# Patient Record
Sex: MALE | Race: WHITE | URBAN - METROPOLITAN AREA
[De-identification: names, ages, dates, MRNs, and addresses within clinical notes are randomized per-mention and may not be internally consistent; named-entity substitution may affect disease eponyms.]

---

## 2023-05-16 ENCOUNTER — LAB OUTSIDE AN ENCOUNTER (OUTPATIENT)
Dept: URBAN - METROPOLITAN AREA CLINIC 107 | Facility: CLINIC | Age: 43
End: 2023-05-16

## 2023-05-16 ENCOUNTER — OFFICE VISIT (OUTPATIENT)
Dept: URBAN - METROPOLITAN AREA CLINIC 107 | Facility: CLINIC | Age: 43
End: 2023-05-16
Payer: COMMERCIAL

## 2023-05-16 VITALS
SYSTOLIC BLOOD PRESSURE: 132 MMHG | WEIGHT: 300 LBS | HEIGHT: 74 IN | BODY MASS INDEX: 38.5 KG/M2 | HEART RATE: 101 BPM | RESPIRATION RATE: 20 BRPM | TEMPERATURE: 96.6 F | DIASTOLIC BLOOD PRESSURE: 76 MMHG

## 2023-05-16 DIAGNOSIS — R19.7 DIARRHEA, UNSPECIFIED TYPE: ICD-10-CM

## 2023-05-16 DIAGNOSIS — D50.0 IRON DEFICIENCY ANEMIA DUE TO CHRONIC BLOOD LOSS: ICD-10-CM

## 2023-05-16 PROBLEM — 724556004: Status: ACTIVE | Noted: 2023-05-16

## 2023-05-16 PROCEDURE — 99204 OFFICE O/P NEW MOD 45 MIN: CPT | Performed by: INTERNAL MEDICINE

## 2023-05-16 RX ORDER — CELECOXIB 200 MG/1
1 CAPSULE WITH FOOD CAPSULE ORAL ONCE A DAY
Status: ACTIVE | COMMUNITY

## 2023-05-16 RX ORDER — ATENOLOL 25 MG/1
1 TABLET TABLET ORAL ONCE A DAY
Status: ACTIVE | COMMUNITY

## 2023-05-16 RX ORDER — PHENTERMINE HYDROCHLORIDE 37.5 MG/1
1 TABLET BEFORE BREAKFAST TABLET ORAL ONCE A DAY
Status: ACTIVE | COMMUNITY

## 2023-05-16 RX ORDER — METHYLPHENIDATE HYDROCHLORIDE 36 MG/1
1 TABLET IN THE MORNING TABLET, EXTENDED RELEASE ORAL ONCE A DAY
Status: ACTIVE | COMMUNITY

## 2023-05-16 RX ORDER — LISINOPRIL 20 MG/1
1 TABLET TABLET ORAL ONCE A DAY
Status: ACTIVE | COMMUNITY

## 2023-05-16 NOTE — HPI-TODAY'S VISIT:
Abdias Gracia is a morbidly obese 42-year-old male who presents with new onset iron deficiency anemia.  The patient's baseline hemoglobin previously was 16.3.  He recently had labs done showing a hemoglobin of 9.4 with an MCV of 67.  He has a longstanding history of diarrhea predominant irritable bowel syndrome, dating back 12 years.  He averages 3-5 loose stools per day.  He normally has bright red rectal bleeding with defecation, particularly over the last 2 years.  Over the last 6 months, he has noted progressive weakness with minimal exertion, and states that he is "weak all the time."  He is lightheaded every day.  He becomes winded with minimal exertion.  He has had no weight loss, denies any fever, chills, or sweats, and there is no family history of Crohn's disease, ulcerative colitis, colon cancer, etc.  He has never had a colonoscopy in the past.

## 2023-05-19 ENCOUNTER — OFFICE VISIT (OUTPATIENT)
Dept: URBAN - METROPOLITAN AREA MEDICAL CENTER 19 | Facility: MEDICAL CENTER | Age: 43
End: 2023-05-19

## 2023-05-19 ENCOUNTER — CLAIMS CREATED FROM THE CLAIM WINDOW (OUTPATIENT)
Dept: URBAN - METROPOLITAN AREA MEDICAL CENTER 19 | Facility: MEDICAL CENTER | Age: 43
End: 2023-05-19
Payer: COMMERCIAL

## 2023-05-19 DIAGNOSIS — R19.7 DIARRHEA, UNSPECIFIED TYPE: ICD-10-CM

## 2023-05-19 DIAGNOSIS — D50.9 IRON DEFICIENCY ANEMIA, UNSPECIFIED IRON DEFICIENCY ANEMIA TYPE: ICD-10-CM

## 2023-05-19 DIAGNOSIS — K57.30 ACQUIRED DIVERTICULOSIS OF COLON: ICD-10-CM

## 2023-05-19 DIAGNOSIS — K92.1 ACUTE MELENA: ICD-10-CM

## 2023-05-19 DIAGNOSIS — K64.0 BLEEDING GRADE I HEMORRHOIDS: ICD-10-CM

## 2023-05-19 PROCEDURE — 45380 COLONOSCOPY AND BIOPSY: CPT | Performed by: INTERNAL MEDICINE

## 2023-05-26 ENCOUNTER — TELEPHONE ENCOUNTER (OUTPATIENT)
Dept: URBAN - METROPOLITAN AREA CLINIC 107 | Facility: CLINIC | Age: 43
End: 2023-05-26

## 2023-05-30 ENCOUNTER — WEB ENCOUNTER (OUTPATIENT)
Dept: URBAN - METROPOLITAN AREA CLINIC 107 | Facility: CLINIC | Age: 43
End: 2023-05-30

## 2023-05-30 ENCOUNTER — LAB OUTSIDE AN ENCOUNTER (OUTPATIENT)
Dept: URBAN - METROPOLITAN AREA CLINIC 107 | Facility: CLINIC | Age: 43
End: 2023-05-30

## 2023-05-30 ENCOUNTER — OFFICE VISIT (OUTPATIENT)
Dept: URBAN - METROPOLITAN AREA CLINIC 107 | Facility: CLINIC | Age: 43
End: 2023-05-30
Payer: COMMERCIAL

## 2023-05-30 VITALS
BODY MASS INDEX: 37.5 KG/M2 | SYSTOLIC BLOOD PRESSURE: 124 MMHG | DIASTOLIC BLOOD PRESSURE: 65 MMHG | HEART RATE: 94 BPM | WEIGHT: 292.2 LBS | TEMPERATURE: 97.6 F | RESPIRATION RATE: 18 BRPM | HEIGHT: 74 IN

## 2023-05-30 DIAGNOSIS — K58.0 IRRITABLE BOWEL SYNDROME WITH DIARRHEA: ICD-10-CM

## 2023-05-30 DIAGNOSIS — K64.8 INTERNAL HEMORRHOIDS: ICD-10-CM

## 2023-05-30 DIAGNOSIS — K62.5 RECTAL BLEEDING: ICD-10-CM

## 2023-05-30 DIAGNOSIS — D50.0 IRON DEFICIENCY ANEMIA DUE TO CHRONIC BLOOD LOSS: ICD-10-CM

## 2023-05-30 PROBLEM — 12063002: Status: ACTIVE | Noted: 2023-05-30

## 2023-05-30 PROBLEM — 197125005: Status: ACTIVE | Noted: 2023-05-30

## 2023-05-30 PROBLEM — 62315008: Status: ACTIVE | Noted: 2023-05-30

## 2023-05-30 PROBLEM — 90458007: Status: ACTIVE | Noted: 2023-05-30

## 2023-05-30 PROCEDURE — 99214 OFFICE O/P EST MOD 30 MIN: CPT | Performed by: INTERNAL MEDICINE

## 2023-05-30 RX ORDER — PHENTERMINE HYDROCHLORIDE 37.5 MG/1
1 TABLET BEFORE BREAKFAST TABLET ORAL ONCE A DAY
Status: ACTIVE | COMMUNITY

## 2023-05-30 RX ORDER — DICYCLOMINE HYDROCHLORIDE 10 MG/1
1 TABLET CAPSULE ORAL
Status: ACTIVE | COMMUNITY

## 2023-05-30 RX ORDER — PANTOPRAZOLE SODIUM 40 MG/1
1 TABLET TABLET, DELAYED RELEASE ORAL ONCE A DAY
Qty: 30 | Refills: 5 | OUTPATIENT
Start: 2023-05-30

## 2023-05-30 RX ORDER — HYDROCORTISONE ACETATE 25 MG/1
1 SUPPOSITORY SUPPOSITORY RECTAL AT BEDTIME
Qty: 7 SUPPOSITORIES | Refills: 1 | OUTPATIENT
Start: 2023-05-30 | End: 2023-06-13

## 2023-05-30 RX ORDER — METHYLPHENIDATE HYDROCHLORIDE 36 MG/1
1 TABLET IN THE MORNING TABLET, EXTENDED RELEASE ORAL ONCE A DAY
Status: ACTIVE | COMMUNITY

## 2023-05-30 RX ORDER — ATENOLOL 25 MG/1
1 TABLET TABLET ORAL ONCE A DAY
Status: ACTIVE | COMMUNITY

## 2023-05-30 RX ORDER — LISINOPRIL 20 MG/1
1 TABLET TABLET ORAL ONCE A DAY
Status: ACTIVE | COMMUNITY

## 2023-05-30 RX ORDER — POTASSIUM CHLORIDE 1500 MG/1
1 TABLET WITH FOOD TABLET, EXTENDED RELEASE ORAL ONCE A DAY
Status: ACTIVE | COMMUNITY

## 2023-05-30 RX ORDER — DICYCLOMINE HYDROCHLORIDE 10 MG/1
1 TABLET CAPSULE ORAL
Qty: 90 | Refills: 3 | OUTPATIENT
Start: 2023-05-30 | End: 2023-09-27

## 2023-05-30 RX ORDER — CELECOXIB 200 MG/1
1 CAPSULE WITH FOOD CAPSULE ORAL ONCE A DAY
Status: ACTIVE | COMMUNITY

## 2023-05-30 NOTE — HPI-TODAY'S VISIT:
This is a 42-year-old male with  a history of ADD, hypertension, anxiety/depression, GERD, IBS, iron deficiency anemia, and diarrhea presenting for urgent follow-up after an ED visit for diarrhea and abdominal pain. He was initially seen 5/16/2023 for evaluation of iron deficiency anemia and diarrhea.  He was scheduled for a colonoscopy.   At baseline, he has diarrhea predominant irritable bowel syndrome.  He typically has 2-4 loose bowel movements per day.  For the last year, he reports there is "always" been some blood with every bowel movement described as small volume on the tissue in the toilet water.  He is also noticed occasional clots. On 5/23/2023, he developed significant diarrhea reporting a bowel movement every hour.  This persisted for over 24 hours.  Stool frequency improved.  He ate chicken noodle soup and bowel movements every hour resumed.  This persisted.  He went to the hospital where he was observed for 24 hours.  He did not have bowel movements while he was at the hospital.  He produced a small volume stool there was an adequate enough to perform stool studies.  On 5/28 and 5/29, he had loose bowel movements occurring 2-4 times per day.  He did not notice blood with any of his bowel movements during this episode.  Today, he had a bowel movement that contained a small volume of stool and nothing bright red blood.  Another stool contained nothing but red blood.  He denies proctalgia. For IBS, he takes dicyclomine twice a day.  He reports this "settles" his stomach.  He has been on Celebrex 200 mg twice a day for a year.  Prior to that, he was taking Motrin.  He denies heartburn, regurgitation, dysphagia, or nausea.  He denies any other abdominal symptoms.  He denies fever. He reports feeling somewhat lightheaded when he stands.  This lasts for a few minutes and resolves.  He reports fatigue associated with walking.  He denies dyspnea or chest pain. He is not on a fiber supplement.  He takes probiotics daily.  He started taking oral iron yesterday.  He was prescribed potassium supplements for 3 days following hospital discharge.

## 2023-05-30 NOTE — HPI-OTHER HISTORIES
Colonoscopy 5/19/2023: Mild sigmoid diverticulosis, normal terminal ileum, nonbleeding internal hemorrhoids, the entire colon was normal status post biopsy.  Pathology: Random biopsies demonstrated fragments of benign colonic mucosa with no significant diagnostic abnormality.  Negative features suggestive of microscopic colitis. Labs  5/27/2023:CBC: WBC 6.3, hemoglobin 6.4, MCV 67.7, platelet 254.  BMP normal with exception of glucose 109, potassium 2.8, calcium 7.0.  Cellavision smear review: Moderate anisocytes, microcytes, ovalocytes.  5/26/2023: CBC: WBC 7.2, hemoglobin 7.7, MCV 68.3, platelet 304.  BMP normal with exception of glucose 111, potassium 3, BUN 8, calcium 7.4.  LFTs: TB 0.5, ALP 44, ALT 53, AST 60.  Urinalysis notable for 2+ protein, 13 WBC, moderate bacteria, many mucus, 32 hyaline casts.  HIV nonreactive.  Ferritin 9, iron 16.88.  Magnesium 1.4.  Hepatitis C antibody nonreactive.  Folate greater than 20. Chest x-ray 5/26/2023:No acute chest finding.

## 2023-05-31 ENCOUNTER — LAB OUTSIDE AN ENCOUNTER (OUTPATIENT)
Dept: URBAN - METROPOLITAN AREA CLINIC 113 | Facility: CLINIC | Age: 43
End: 2023-05-31

## 2023-05-31 ENCOUNTER — TELEPHONE ENCOUNTER (OUTPATIENT)
Dept: URBAN - METROPOLITAN AREA CLINIC 113 | Facility: CLINIC | Age: 43
End: 2023-05-31

## 2023-05-31 PROBLEM — 87522002: Status: ACTIVE | Noted: 2023-05-31

## 2023-06-05 ENCOUNTER — TELEPHONE ENCOUNTER (OUTPATIENT)
Dept: URBAN - METROPOLITAN AREA CLINIC 113 | Facility: CLINIC | Age: 43
End: 2023-06-05

## 2023-06-07 ENCOUNTER — TELEPHONE ENCOUNTER (OUTPATIENT)
Dept: URBAN - METROPOLITAN AREA CLINIC 113 | Facility: CLINIC | Age: 43
End: 2023-06-07

## 2023-06-07 LAB
ABSOLUTE BASOPHILS: 67
ABSOLUTE EOSINOPHILS: 79
ABSOLUTE LYMPHOCYTES: 1208
ABSOLUTE MONOCYTES: 403
ABSOLUTE NEUTROPHILS: 4343
BASOPHILS: 1.1
CBC MORPHOLOGY: (no result)
EOSINOPHILS: 1.3
HEMATOCRIT: 30.8
HEMOGLOBIN: 8.5
LYMPHOCYTES: 19.8
MCH: 19.5
MCHC: 27.6
MCV: 70.8
MONOCYTES: 6.6
MPV: 10.1
NEUTROPHILS: 71.2
PLATELET COUNT: 296
RDW: 23.7
RED BLOOD CELL COUNT: 4.35
WHITE BLOOD CELL COUNT: 6.1

## 2023-06-14 ENCOUNTER — CLAIMS CREATED FROM THE CLAIM WINDOW (OUTPATIENT)
Dept: URBAN - METROPOLITAN AREA SURGERY CENTER 25 | Facility: SURGERY CENTER | Age: 43
End: 2023-06-14
Payer: COMMERCIAL

## 2023-06-14 ENCOUNTER — CLAIMS CREATED FROM THE CLAIM WINDOW (OUTPATIENT)
Dept: URBAN - METROPOLITAN AREA SURGERY CENTER 25 | Facility: SURGERY CENTER | Age: 43
End: 2023-06-14

## 2023-06-14 ENCOUNTER — CLAIMS CREATED FROM THE CLAIM WINDOW (OUTPATIENT)
Dept: URBAN - METROPOLITAN AREA CLINIC 4 | Facility: CLINIC | Age: 43
End: 2023-06-14
Payer: COMMERCIAL

## 2023-06-14 DIAGNOSIS — K31.89 OTHER DISEASES OF STOMACH AND DUODENUM: ICD-10-CM

## 2023-06-14 DIAGNOSIS — D50.8 OTHER IRON DEFICIENCY ANEMIA: ICD-10-CM

## 2023-06-14 DIAGNOSIS — D50.9 ANEMIA, IRON DEFICIENCY: ICD-10-CM

## 2023-06-14 DIAGNOSIS — K44.9 DIAPHRAGMATIC HERNIA: ICD-10-CM

## 2023-06-14 DIAGNOSIS — K29.50 CHRONIC GASTRITIS: ICD-10-CM

## 2023-06-14 DIAGNOSIS — K29.60 OTHER GASTRITIS WITHOUT BLEEDING: ICD-10-CM

## 2023-06-14 DIAGNOSIS — K29.70 GASTRITIS, UNSPECIFIED, WITHOUT BLEEDING: ICD-10-CM

## 2023-06-14 PROCEDURE — 88305 TISSUE EXAM BY PATHOLOGIST: CPT | Performed by: PATHOLOGY

## 2023-06-14 PROCEDURE — 00731 ANES UPR GI NDSC PX NOS: CPT | Performed by: ANESTHESIOLOGY

## 2023-06-14 PROCEDURE — 88313 SPECIAL STAINS GROUP 2: CPT | Performed by: PATHOLOGY

## 2023-06-14 PROCEDURE — G8907 PT DOC NO EVENTS ON DISCHARG: HCPCS | Performed by: INTERNAL MEDICINE

## 2023-06-14 PROCEDURE — 43239 EGD BIOPSY SINGLE/MULTIPLE: CPT | Performed by: INTERNAL MEDICINE

## 2023-06-14 PROCEDURE — 00731 ANES UPR GI NDSC PX NOS: CPT | Performed by: ANESTHESIOLOGIST ASSISTANT

## 2023-06-14 PROCEDURE — 88312 SPECIAL STAINS GROUP 1: CPT | Performed by: PATHOLOGY

## 2023-06-14 RX ORDER — METHYLPHENIDATE HYDROCHLORIDE 36 MG/1
1 TABLET IN THE MORNING TABLET, EXTENDED RELEASE ORAL ONCE A DAY
Status: ACTIVE | COMMUNITY

## 2023-06-14 RX ORDER — POTASSIUM CHLORIDE 1500 MG/1
1 TABLET WITH FOOD TABLET, EXTENDED RELEASE ORAL ONCE A DAY
Status: ACTIVE | COMMUNITY

## 2023-06-14 RX ORDER — PHENTERMINE HYDROCHLORIDE 37.5 MG/1
1 TABLET BEFORE BREAKFAST TABLET ORAL ONCE A DAY
Status: ACTIVE | COMMUNITY

## 2023-06-14 RX ORDER — DICYCLOMINE HYDROCHLORIDE 10 MG/1
1 TABLET CAPSULE ORAL
Qty: 90 | Refills: 3 | Status: ACTIVE | COMMUNITY
Start: 2023-05-30 | End: 2023-09-27

## 2023-06-14 RX ORDER — CELECOXIB 200 MG/1
1 CAPSULE WITH FOOD CAPSULE ORAL ONCE A DAY
Status: ACTIVE | COMMUNITY

## 2023-06-14 RX ORDER — PANTOPRAZOLE SODIUM 40 MG/1
1 TABLET TABLET, DELAYED RELEASE ORAL ONCE A DAY
Qty: 30 | Refills: 5 | Status: ACTIVE | COMMUNITY
Start: 2023-05-30

## 2023-06-14 RX ORDER — DICYCLOMINE HYDROCHLORIDE 10 MG/1
1 TABLET CAPSULE ORAL
Status: ACTIVE | COMMUNITY

## 2023-06-14 RX ORDER — LISINOPRIL 20 MG/1
1 TABLET TABLET ORAL ONCE A DAY
Status: ACTIVE | COMMUNITY

## 2023-06-14 RX ORDER — ATENOLOL 25 MG/1
1 TABLET TABLET ORAL ONCE A DAY
Status: ACTIVE | COMMUNITY

## 2023-06-15 LAB
HEMATOCRIT: 38.8
HEMOGLOBIN: 11
MCH: 21.9
MCHC: 28.4
MCV: 77.3
MPV: 9.7
PLATELET COUNT: 273
RDW: 27.5
RED BLOOD CELL COUNT: 5.02
WHITE BLOOD CELL COUNT: 3.7

## 2023-08-15 ENCOUNTER — OFFICE VISIT (OUTPATIENT)
Dept: URBAN - METROPOLITAN AREA CLINIC 107 | Facility: CLINIC | Age: 43
End: 2023-08-15
Payer: COMMERCIAL

## 2023-08-15 ENCOUNTER — LAB OUTSIDE AN ENCOUNTER (OUTPATIENT)
Dept: URBAN - METROPOLITAN AREA CLINIC 107 | Facility: CLINIC | Age: 43
End: 2023-08-15

## 2023-08-15 VITALS
TEMPERATURE: 97.1 F | SYSTOLIC BLOOD PRESSURE: 133 MMHG | DIASTOLIC BLOOD PRESSURE: 79 MMHG | WEIGHT: 291 LBS | HEART RATE: 95 BPM | RESPIRATION RATE: 18 BRPM | HEIGHT: 74 IN | BODY MASS INDEX: 37.35 KG/M2

## 2023-08-15 DIAGNOSIS — K64.8 INTERNAL HEMORRHOIDS: ICD-10-CM

## 2023-08-15 DIAGNOSIS — D50.0 IRON DEFICIENCY ANEMIA DUE TO CHRONIC BLOOD LOSS: ICD-10-CM

## 2023-08-15 DIAGNOSIS — K62.5 RECTAL BLEEDING: ICD-10-CM

## 2023-08-15 DIAGNOSIS — K58.0 IRRITABLE BOWEL SYNDROME WITH DIARRHEA: ICD-10-CM

## 2023-08-15 PROCEDURE — 99214 OFFICE O/P EST MOD 30 MIN: CPT | Performed by: INTERNAL MEDICINE

## 2023-08-15 RX ORDER — COLESEVELAM HYDROCHLORIDE 625 MG/1
1 TABLET TABLET, COATED ORAL TWICE A DAY
Qty: 60 TABLET | Refills: 5 | OUTPATIENT
Start: 2023-08-15

## 2023-08-15 RX ORDER — METHYLPHENIDATE HYDROCHLORIDE 36 MG/1
1 TABLET IN THE MORNING TABLET, EXTENDED RELEASE ORAL ONCE A DAY
Status: ACTIVE | COMMUNITY

## 2023-08-15 RX ORDER — POTASSIUM CHLORIDE 1500 MG/1
1 TABLET WITH FOOD TABLET, EXTENDED RELEASE ORAL ONCE A DAY
Status: ACTIVE | COMMUNITY

## 2023-08-15 RX ORDER — DICYCLOMINE HYDROCHLORIDE 10 MG/1
1 TABLET CAPSULE ORAL
Qty: 90 | Refills: 3 | Status: ACTIVE | COMMUNITY
Start: 2023-05-30 | End: 2023-09-27

## 2023-08-15 RX ORDER — LISINOPRIL 20 MG/1
1 TABLET TABLET ORAL ONCE A DAY
Status: ACTIVE | COMMUNITY

## 2023-08-15 RX ORDER — CELECOXIB 200 MG/1
1 CAPSULE WITH FOOD CAPSULE ORAL ONCE A DAY
Status: ACTIVE | COMMUNITY

## 2023-08-15 RX ORDER — PANTOPRAZOLE SODIUM 40 MG/1
1 TABLET TABLET, DELAYED RELEASE ORAL ONCE A DAY
Qty: 30 | Refills: 5 | Status: ACTIVE | COMMUNITY
Start: 2023-05-30

## 2023-08-15 RX ORDER — ATENOLOL 25 MG/1
1 TABLET TABLET ORAL ONCE A DAY
Status: ACTIVE | COMMUNITY

## 2023-08-15 RX ORDER — PHENTERMINE HYDROCHLORIDE 37.5 MG/1
1 TABLET BEFORE BREAKFAST TABLET ORAL ONCE A DAY
Status: ACTIVE | COMMUNITY

## 2023-08-15 NOTE — HPI-TODAY'S VISIT:
This is a 42-year-old male with a history of ADD, hypertension, anxiety/depression, GERD, IBS, iron deficiency anemia, and diarrhea presenting for follow-up. He was last seen 5/30/2023 for iron deficiency anemia associated with chronic GI blood loss.  He reported chronic small-volume red blood per rectum that had worsened the day of his visit.  He had worsening diarrhea he was found to have significant anemia.  He declined a blood transfusion.  Requiring an emergency department visit on 5/26.  A stat CBC was recommended.  It was suspected that bleeding may be hemorrhoidal in origin.  Transfusion was a consideration if his hemoglobin had decreased.  In the interim, he was to begin daily fiber and use Anusol suppositories.  He was to stop Celebrex.  He was to start pantoprazole 40 mg daily to cover upper GI pathology.  EGD was recommended to assess for gastroduodenal pathology.  Due to recent electrolyte imbalance associated with diarrhea, electrolytes were also rechecked. Hemoglobin was 6.7.  2 units of packed red blood cells were ordered.  He was to increase ferrous sulfate to twice a day and repeat labs the following week.  Referral for definitive treatment of hemorrhoids was a consideration. He called our office 6/7/2023 complaining of persistent bleeding.  A repeat CBC was ordered.  He was taking Benefiber 2 tablespoons daily for 1 week. EGD 6/14/2023:Irregular Z-line at the GE junction status post biopsy, small hiatal hernia, small amount of food residue in stomach, no gross lesions in the duodenum status post biopsy, exam otherwise normal.  Pathology: Duodenal biopsy demonstrated no significant abnormality.  GE junction biopsy demonstrated chronic gastritis, nonspecific without evidence of H. pylori or intestinal metaplasia. CBC 6/14/2023:WBC 3.7, hemoglobin 11, MCV 77.3, platelet 273. He reports bleeding subsided for a while.  It has recurred for the last 2 weeks with every bowel movement.  Blood is red and mixed in the stool or in the toilet water.  It is of a variable volume.  He was having 5 or 6 loose or watery bowel movements per day. He denies nocturnal stools.  Occasionally, he has urgency to defecate, has a bowel movement, and afterward, he still feels as though "my body is trying to push it out."  This will result in feeling hot and sweaty.  He is not having abdominal pain or cramps.  He is taking dicyclomine twice a day.  He denies any other abdominal symptoms.  He is no longer feeling weak as he was prior to his transfusion.

## 2023-08-15 NOTE — HPI-OTHER HISTORIES
EGD 6/14/2023:Irregular Z-line at the GE junction status post biopsy, small hiatal hernia, small amount of food residue in stomach, no gross lesions in the duodenum status post biopsy, exam otherwise normal.  Pathology: Duodenal biopsy demonstrated no significant abnormality.  GE junction biopsy demonstrated chronic gastritis, nonspecific without evidence of H. pylori or intestinal metaplasia. CBC 6/14/2023:WBC 3.7, hemoglobin 11, MCV 77.3, platelet 273. Colonoscopy 5/19/2023: Mild sigmoid diverticulosis, normal terminal ileum, nonbleeding internal hemorrhoids, the entire colon was normal status post biopsy.  Pathology: Random biopsies demonstrated fragments of benign colonic mucosa with no significant diagnostic abnormality.  Negative features suggestive of microscopic colitis. Labs  5/27/2023:CBC: WBC 6.3, hemoglobin 6.4, MCV 67.7, platelet 254.  BMP normal with exception of glucose 109, potassium 2.8, calcium 7.0.  Cellavision smear review: Moderate anisocytes, microcytes, ovalocytes.  5/26/2023: CBC: WBC 7.2, hemoglobin 7.7, MCV 68.3, platelet 304.  BMP normal with exception of glucose 111, potassium 3, BUN 8, calcium 7.4.  LFTs: TB 0.5, ALP 44, ALT 53, AST 60.  Urinalysis notable for 2+ protein, 13 WBC, moderate bacteria, many mucus, 32 hyaline casts.  HIV nonreactive.  Ferritin 9, iron 16.88.  Magnesium 1.4.  Hepatitis C antibody nonreactive.  Folate greater than 20. Chest x-ray 5/26/2023:No acute chest finding.

## 2023-09-14 ENCOUNTER — OFFICE VISIT (OUTPATIENT)
Dept: URBAN - METROPOLITAN AREA CLINIC 112 | Facility: CLINIC | Age: 43
End: 2023-09-14
Payer: COMMERCIAL

## 2023-09-14 ENCOUNTER — OFFICE VISIT (OUTPATIENT)
Dept: URBAN - METROPOLITAN AREA CLINIC 112 | Facility: CLINIC | Age: 43
End: 2023-09-14

## 2023-09-14 VITALS — BODY MASS INDEX: 36.83 KG/M2 | HEIGHT: 74 IN | WEIGHT: 287 LBS

## 2023-09-14 DIAGNOSIS — D50.0 IRON DEFICIENCY ANEMIA DUE TO CHRONIC BLOOD LOSS: ICD-10-CM

## 2023-09-14 PROCEDURE — 91110 GI TRC IMG INTRAL ESOPH-ILE: CPT | Performed by: INTERNAL MEDICINE

## 2023-09-14 RX ORDER — PHENTERMINE HYDROCHLORIDE 37.5 MG/1
1 TABLET BEFORE BREAKFAST TABLET ORAL ONCE A DAY
Status: ACTIVE | COMMUNITY

## 2023-09-14 RX ORDER — CELECOXIB 200 MG/1
1 CAPSULE WITH FOOD CAPSULE ORAL ONCE A DAY
Status: ACTIVE | COMMUNITY

## 2023-09-14 RX ORDER — ATENOLOL 25 MG/1
1 TABLET TABLET ORAL ONCE A DAY
Status: ACTIVE | COMMUNITY

## 2023-09-14 RX ORDER — LISINOPRIL 20 MG/1
1 TABLET TABLET ORAL ONCE A DAY
Status: ACTIVE | COMMUNITY

## 2023-09-14 RX ORDER — DICYCLOMINE HYDROCHLORIDE 10 MG/1
1 TABLET CAPSULE ORAL
Qty: 90 | Refills: 3 | Status: ACTIVE | COMMUNITY
Start: 2023-05-30 | End: 2023-09-27

## 2023-09-14 RX ORDER — POTASSIUM CHLORIDE 1500 MG/1
1 TABLET WITH FOOD TABLET, EXTENDED RELEASE ORAL ONCE A DAY
Status: ACTIVE | COMMUNITY

## 2023-09-14 RX ORDER — COLESEVELAM HYDROCHLORIDE 625 MG/1
1 TABLET TABLET, COATED ORAL TWICE A DAY
Qty: 60 TABLET | Refills: 5 | Status: ACTIVE | COMMUNITY
Start: 2023-08-15

## 2023-09-14 RX ORDER — METHYLPHENIDATE HYDROCHLORIDE 36 MG/1
1 TABLET IN THE MORNING TABLET, EXTENDED RELEASE ORAL ONCE A DAY
Status: ACTIVE | COMMUNITY

## 2023-09-14 RX ORDER — PANTOPRAZOLE SODIUM 40 MG/1
1 TABLET TABLET, DELAYED RELEASE ORAL ONCE A DAY
Qty: 30 | Refills: 5 | Status: ACTIVE | COMMUNITY
Start: 2023-05-30

## 2023-09-15 ENCOUNTER — TELEPHONE ENCOUNTER (OUTPATIENT)
Dept: URBAN - METROPOLITAN AREA CLINIC 113 | Facility: CLINIC | Age: 43
End: 2023-09-15

## 2023-11-12 ENCOUNTER — ERX REFILL RESPONSE (OUTPATIENT)
Dept: URBAN - METROPOLITAN AREA CLINIC 72 | Facility: CLINIC | Age: 43
End: 2023-11-12

## 2023-11-12 RX ORDER — PANTOPRAZOLE SODIUM 40 MG/1
1 TABLET TABLET, DELAYED RELEASE ORAL ONCE A DAY
Qty: 30 | Refills: 5 | OUTPATIENT

## 2023-11-12 RX ORDER — PANTOPRAZOLE 40 MG/1
TAKE 1 TABLET BY MOUTH ONCE DAILY TABLET, DELAYED RELEASE ORAL
Qty: 30 TABLET | Refills: 11 | OUTPATIENT

## 2023-11-21 ENCOUNTER — OFFICE VISIT (OUTPATIENT)
Dept: URBAN - METROPOLITAN AREA CLINIC 107 | Facility: CLINIC | Age: 43
End: 2023-11-21

## 2023-12-12 ENCOUNTER — DASHBOARD ENCOUNTERS (OUTPATIENT)
Age: 43
End: 2023-12-12

## 2023-12-12 ENCOUNTER — CLAIMS CREATED FROM THE CLAIM WINDOW (OUTPATIENT)
Dept: URBAN - METROPOLITAN AREA CLINIC 107 | Facility: CLINIC | Age: 43
End: 2023-12-12
Payer: COMMERCIAL

## 2023-12-12 VITALS
TEMPERATURE: 97.8 F | HEIGHT: 74 IN | HEART RATE: 107 BPM | WEIGHT: 291 LBS | DIASTOLIC BLOOD PRESSURE: 93 MMHG | SYSTOLIC BLOOD PRESSURE: 141 MMHG | BODY MASS INDEX: 37.35 KG/M2

## 2023-12-12 DIAGNOSIS — K64.8 INTERNAL HEMORRHOIDS: ICD-10-CM

## 2023-12-12 DIAGNOSIS — K62.5 RECTAL BLEEDING: ICD-10-CM

## 2023-12-12 DIAGNOSIS — R19.7 DIARRHEA, UNSPECIFIED TYPE: ICD-10-CM

## 2023-12-12 DIAGNOSIS — K58.0 IRRITABLE BOWEL SYNDROME WITH DIARRHEA: ICD-10-CM

## 2023-12-12 DIAGNOSIS — D50.8 ACHLORHYDRIC ANEMIA: ICD-10-CM

## 2023-12-12 DIAGNOSIS — D50.0 IRON DEFICIENCY ANEMIA DUE TO CHRONIC BLOOD LOSS: ICD-10-CM

## 2023-12-12 PROCEDURE — 99214 OFFICE O/P EST MOD 30 MIN: CPT | Performed by: NURSE PRACTITIONER

## 2023-12-12 RX ORDER — METHYLPHENIDATE HYDROCHLORIDE 36 MG/1
1 TABLET IN THE MORNING TABLET, EXTENDED RELEASE ORAL ONCE A DAY
Status: ACTIVE | COMMUNITY

## 2023-12-12 RX ORDER — COLESEVELAM HYDROCHLORIDE 625 MG/1
1 TABLET TABLET, COATED ORAL TWICE A DAY
Qty: 60 TABLET | Refills: 5 | Status: ACTIVE | COMMUNITY
Start: 2023-08-15

## 2023-12-12 RX ORDER — PANTOPRAZOLE 40 MG/1
TAKE 1 TABLET BY MOUTH ONCE DAILY TABLET, DELAYED RELEASE ORAL
Qty: 30 TABLET | Refills: 11 | Status: ACTIVE | COMMUNITY

## 2023-12-12 RX ORDER — ATENOLOL 25 MG/1
1 TABLET TABLET ORAL ONCE A DAY
Status: ACTIVE | COMMUNITY

## 2023-12-12 RX ORDER — DICYCLOMINE HYDROCHLORIDE 10 MG/1
1 TABLET CAPSULE ORAL
Status: ACTIVE | COMMUNITY

## 2023-12-12 RX ORDER — LISINOPRIL 20 MG/1
1 TABLET TABLET ORAL ONCE A DAY
Status: ACTIVE | COMMUNITY

## 2023-12-12 NOTE — HPI-TODAY'S VISIT:
This is a 43-year-old male with a history of ADD, hypertension, anxiety/depression, GERD, IBS, iron deficiency anemia, rectal bleeding, and diarrhea presenting for follow-up. He was last seen in the office 8/15/2023 regarding iron deficiency anemia.  EGD and colonoscopy were negative for source.  He had persistent rectal bleeding which was possibly contributing.  Labs were repeated.  He was scheduled for an imaging capsule and referred to Dr. Sanchez for evaluation of internal hemorrhoids.  He had persistent diarrhea on daily fiber for a month.  Excessive bile salts were consideration.  He was prescribed colesevelam twice a day and was to continue dicyclomine as needed for abdominal pain.  Xifaxan was a future consideration. Imaging capsule 9/14/2023 normal small bowel capsule endoscopy. Labs 8/15/2023:Iron 60 (L), TIBC 323, iron saturation 18.6% (L), ferritin 28.9.  CBC: WBC 4.92, hemoglobin 14.5, MCV 90.3, platelet  212. He required an urgent umbilical hernia repair in late September at [facility].  He took colesevelam for a while.  He is not sure whether or not it was effective.  He stopped it.  He is having loose or watery bowel movements as few as 4/day.  He has persistent red blood per rectum with each bowel movement.  Usually, it is of a small volume.  If he strains, he "cheats and stream of blood."  He has occasional tenderness near his umbilicus.  He has not been taking dicyclomine.  He attributes tenderness to recent surgery.  He denies other abdominal symptoms.  He did not receive a call from Dr. Sanchez to schedule an appointment.  He is concerned regarding hemorrhoid surgery because his mother experienced significant pain after a hernia repair.

## 2023-12-12 NOTE — HPI-OTHER HISTORIES
Imaging capsule 9/14/2023 normal small bowel capsule endoscopy.  EGD 6/14/2023:Irregular Z-line at the GE junction status post biopsy, small hiatal hernia, small amount of food residue in stomach, no gross lesions in the duodenum status post biopsy, exam otherwise normal.  Pathology: Duodenal biopsy demonstrated no significant abnormality.  GE junction biopsy demonstrated chronic gastritis, nonspecific without evidence of H. pylori or intestinal metaplasia.  Colonoscopy 5/19/2023: Mild sigmoid diverticulosis, normal terminal ileum, nonbleeding internal hemorrhoids, the entire colon was normal status post biopsy.  Pathology: Random biopsies demonstrated fragments of benign colonic mucosa with no significant diagnostic abnormality.  Negative features suggestive of microscopic colitis. Labs  8/15/2023:Iron 60 (L), TIBC 323, iron saturation 18.6% (L), ferritin 28.9. CBC: WBC 4.92, hemoglobin 14.5, MCV 90.3, platelet 212. 6/14/2023: CBC:WBC 3.7, hemoglobin 11, MCV 77.3, platelet 273. 5/27/2023:CBC: WBC 6.3, hemoglobin 6.4, MCV 67.7, platelet 254.  BMP normal with exception of glucose 109, potassium 2.8, calcium 7.0.  Cellavision smear review: Moderate anisocytes, microcytes, ovalocytes.  5/26/2023: CBC: WBC 7.2, hemoglobin 7.7, MCV 68.3, platelet 304.  BMP normal with exception of glucose 111, potassium 3, BUN 8, calcium 7.4.  LFTs: TB 0.5, ALP 44, ALT 53, AST 60.  Urinalysis notable for 2+ protein, 13 WBC, moderate bacteria, many mucus, 32 hyaline casts.  HIV nonreactive.  Ferritin 9, iron 16.88.  Magnesium 1.4.  Hepatitis C antibody nonreactive.  Folate greater than 20. Chest x-ray 5/26/2023:No acute chest finding.

## 2024-03-12 ENCOUNTER — OV EP (OUTPATIENT)
Dept: URBAN - METROPOLITAN AREA CLINIC 107 | Facility: CLINIC | Age: 44
End: 2024-03-12

## 2024-03-12 NOTE — HPI-TODAY'S VISIT:
This is a 43-year-old male with a history of attention deficit disorder, hypertension, anxiety/depression, GERD, IBS, iron deficiency anemia, rectal bleeding, and diarrhea presenting for follow-up. He was last seen in the office 12/12/2023.  He had red blood per rectum associated with internal hemorrhoids.  He had required transfusion in the past because of hemorrhoidal bleeding.  Endoscopic evaluation including EGD, colonoscopy, and imaging capsule had been negative with the exception of internal hemorrhoids.  He had persistent bleeding.  Hemoglobin was normal in August.  A repeat CBC was ordered to assess for evidence of anemia.  He was to discuss surgical treatment with Dr. Sanchez as planned at a prior visit.  Diarrhea was likely associated with IBS.  He was unsure whether or not he had responded to colesevelam.  This had been prescribed for possible excessive bile salts.  He was to restart colesevelam and use dicyclomine as needed for abdominal pain.  Xifaxan was a future consideration. Labs 12/12/2023 CBC:WBC 8.08, hemoglobin 15.1, MCV 95.4, platelet 245.

## 2024-06-03 ENCOUNTER — ERX REFILL RESPONSE (OUTPATIENT)
Dept: URBAN - METROPOLITAN AREA CLINIC 72 | Facility: CLINIC | Age: 44
End: 2024-06-03

## 2024-06-03 RX ORDER — COLESEVELAM HYDROCHLORIDE 625 MG/1
1 TABLET TABLET, COATED ORAL TWICE A DAY
Qty: 60 TABLET | Refills: 5 | OUTPATIENT

## 2024-06-03 RX ORDER — COLESEVELAM HYDROCHLORIDE 625 MG/1
TAKE 1 TABLET BY MOUTH TWICE DAILY TABLET, FILM COATED ORAL
Qty: 60 TABLET | Refills: 6 | OUTPATIENT

## 2024-09-30 ENCOUNTER — OFFICE VISIT (OUTPATIENT)
Dept: URBAN - METROPOLITAN AREA CLINIC 107 | Facility: CLINIC | Age: 44
End: 2024-09-30

## 2024-10-03 ENCOUNTER — LAB OUTSIDE AN ENCOUNTER (OUTPATIENT)
Dept: URBAN - METROPOLITAN AREA CLINIC 107 | Facility: CLINIC | Age: 44
End: 2024-10-03

## 2024-10-03 ENCOUNTER — OFFICE VISIT (OUTPATIENT)
Dept: URBAN - METROPOLITAN AREA CLINIC 107 | Facility: CLINIC | Age: 44
End: 2024-10-03
Payer: COMMERCIAL

## 2024-10-03 VITALS
BODY MASS INDEX: 40.43 KG/M2 | TEMPERATURE: 97.2 F | DIASTOLIC BLOOD PRESSURE: 80 MMHG | WEIGHT: 315 LBS | HEART RATE: 96 BPM | HEIGHT: 74 IN | SYSTOLIC BLOOD PRESSURE: 126 MMHG

## 2024-10-03 DIAGNOSIS — K64.2 GRADE III INTERNAL HEMORRHOIDS: ICD-10-CM

## 2024-10-03 DIAGNOSIS — K62.5 RECTAL BLEEDING: ICD-10-CM

## 2024-10-03 DIAGNOSIS — D50.0 IRON DEFICIENCY ANEMIA DUE TO CHRONIC BLOOD LOSS: ICD-10-CM

## 2024-10-03 DIAGNOSIS — K58.0 IRRITABLE BOWEL SYNDROME WITH DIARRHEA: ICD-10-CM

## 2024-10-03 PROBLEM — 721705006: Status: ACTIVE | Noted: 2024-10-03

## 2024-10-03 PROCEDURE — 46600 DIAGNOSTIC ANOSCOPY SPX: CPT | Performed by: NURSE PRACTITIONER

## 2024-10-03 PROCEDURE — 99214 OFFICE O/P EST MOD 30 MIN: CPT | Performed by: NURSE PRACTITIONER

## 2024-10-03 RX ORDER — METHYLPHENIDATE HYDROCHLORIDE 36 MG/1
1 TABLET IN THE MORNING TABLET, EXTENDED RELEASE ORAL ONCE A DAY
Status: ON HOLD | COMMUNITY

## 2024-10-03 RX ORDER — LISINOPRIL 20 MG/1
1 TABLET TABLET ORAL ONCE A DAY
Status: ACTIVE | COMMUNITY

## 2024-10-03 RX ORDER — COLESEVELAM HYDROCHLORIDE 625 MG/1
3 TABLETS WITH MEALS TABLET, FILM COATED ORAL TWICE A DAY
Qty: 180 TABLET | Refills: 11

## 2024-10-03 RX ORDER — DICYCLOMINE HYDROCHLORIDE 10 MG/1
1 TABLET CAPSULE ORAL
Qty: 90 | Refills: 3 | Status: ACTIVE | COMMUNITY

## 2024-10-03 RX ORDER — DIPHENOXYLATE HYDROCHLORIDE AND ATROPINE SULFATE 2.5; .025 MG/1; MG/1
1 TABLET AS NEEDED TABLET ORAL
Qty: 120 TABLET | Refills: 0 | OUTPATIENT
Start: 2024-10-03 | End: 2024-11-02

## 2024-10-03 RX ORDER — COLESEVELAM HYDROCHLORIDE 625 MG/1
TAKE 1 TABLET BY MOUTH TWICE DAILY TABLET, FILM COATED ORAL
Qty: 60 TABLET | Refills: 6 | Status: ACTIVE | COMMUNITY

## 2024-10-03 RX ORDER — PANTOPRAZOLE 40 MG/1
TAKE 1 TABLET BY MOUTH ONCE DAILY TABLET, DELAYED RELEASE ORAL
Qty: 30 TABLET | Refills: 11 | Status: ACTIVE | COMMUNITY

## 2024-10-03 RX ORDER — ATENOLOL 25 MG/1
1 TABLET TABLET ORAL ONCE A DAY
Status: ACTIVE | COMMUNITY

## 2024-10-03 NOTE — EXAM-PHYSICAL EXAM
External skin tag.  Anoscopy: Grade III internal hemorrhoids, no rectal masses, or bleeding present.  PADMA: Normal sphincter tone and rectal muscle upon bearing down. No tenderness on exam.  Chaperone present.

## 2024-10-03 NOTE — HPI-TODAY'S VISIT:
He cannot have banding when his hemorrhoids are bleeding.  This is a 44-year-old male with a history of attention deficit disorder, hypertension, anxiety/depression, GERD, IBS, iron deficiency anemia, rectal bleeding, and diarrhea presenting for follow-up.  He was last seen in the office 12/12/2023.  He had red blood per rectum associated with internal hemorrhoids.  He had required transfusion in the past because of hemorrhoidal bleeding.  Endoscopic evaluation including EGD, colonoscopy, and imaging capsule had been negative except for internal hemorrhoids.  He had persistent bleeding.  Hemoglobin was normal in August.  A repeat CBC was ordered to assess for evidence of anemia.  He was to discuss surgical treatment with Dr. Sanchez as planned at a prior visit.  Diarrhea was likely associated with IBS.  He was unsure whether he had responded to colesevelam.  This had been prescribed for possible excessive bile salts.  He was to restart colesevelam and use dicyclomine as needed for abdominal pain.  Xifaxan was a future consideration.  Labs 12/12/2023 CBC:WBC 8.08, hemoglobin 15.1, MCV 95.4, platelet 245.  He reports symptoms unchanged from previous. He elected not to have hemorrhoidectomy would like banding instead. Has had IBS for years.  4-5 BM's daily, has explosive diarrhea at time.  No melena.  Hemorrhoids do bleed at times.  On dicyclomine which helps with cramping and colesevelam follow-up continues to have diarrhea despite that.  Weight is up 30 pounds from his last appointment

## 2024-10-04 ENCOUNTER — ERX REFILL RESPONSE (OUTPATIENT)
Dept: URBAN - METROPOLITAN AREA CLINIC 72 | Facility: CLINIC | Age: 44
End: 2024-10-04

## 2024-10-04 RX ORDER — DICYCLOMINE HYDROCHLORIDE 10 MG/1
1 TABLET CAPSULE ORAL
Qty: 90 | Refills: 3 | OUTPATIENT

## 2024-10-21 ENCOUNTER — OFFICE VISIT (OUTPATIENT)
Dept: URBAN - METROPOLITAN AREA CLINIC 107 | Facility: CLINIC | Age: 44
End: 2024-10-21

## 2024-11-04 ENCOUNTER — OFFICE VISIT (OUTPATIENT)
Dept: URBAN - METROPOLITAN AREA CLINIC 107 | Facility: CLINIC | Age: 44
End: 2024-11-04
Payer: COMMERCIAL

## 2024-11-04 VITALS
WEIGHT: 315 LBS | BODY MASS INDEX: 40.43 KG/M2 | SYSTOLIC BLOOD PRESSURE: 192 MMHG | HEIGHT: 74 IN | RESPIRATION RATE: 18 BRPM | DIASTOLIC BLOOD PRESSURE: 120 MMHG | HEART RATE: 83 BPM | TEMPERATURE: 97.5 F

## 2024-11-04 DIAGNOSIS — K64.2 GRADE III INTERNAL HEMORRHOIDS: ICD-10-CM

## 2024-11-04 PROCEDURE — 46221 LIGATION OF HEMORRHOID(S): CPT | Performed by: NURSE PRACTITIONER

## 2024-11-04 RX ORDER — ATENOLOL 25 MG/1
1 TABLET TABLET ORAL ONCE A DAY
Status: ACTIVE | COMMUNITY

## 2024-11-04 RX ORDER — METHYLPHENIDATE HYDROCHLORIDE 36 MG/1
1 TABLET IN THE MORNING TABLET, EXTENDED RELEASE ORAL ONCE A DAY
Status: ON HOLD | COMMUNITY

## 2024-11-04 RX ORDER — DICYCLOMINE HYDROCHLORIDE 10 MG/1
1 TABLET CAPSULE ORAL
Qty: 90 | Refills: 3 | Status: ACTIVE | COMMUNITY

## 2024-11-04 RX ORDER — COLESEVELAM HYDROCHLORIDE 625 MG/1
3 TABLETS WITH MEALS TABLET, FILM COATED ORAL TWICE A DAY
Qty: 180 TABLET | Refills: 11 | Status: ACTIVE | COMMUNITY

## 2024-11-04 RX ORDER — LISINOPRIL 20 MG/1
1 TABLET TABLET ORAL ONCE A DAY
Status: ACTIVE | COMMUNITY

## 2024-11-04 RX ORDER — PANTOPRAZOLE 40 MG/1
TAKE 1 TABLET BY MOUTH ONCE DAILY TABLET, DELAYED RELEASE ORAL
Qty: 30 TABLET | Refills: 11 | Status: ACTIVE | COMMUNITY

## 2024-11-12 ENCOUNTER — TELEPHONE ENCOUNTER (OUTPATIENT)
Dept: URBAN - METROPOLITAN AREA CLINIC 107 | Facility: CLINIC | Age: 44
End: 2024-11-12

## 2024-11-12 RX ORDER — DIPHENOXYLATE HYDROCHLORIDE AND ATROPINE SULFATE 2.5; .025 MG/1; MG/1
1 TABLET AS NEEDED TABLET ORAL
Qty: 120 TABLET | Refills: 1
Start: 2024-10-03 | End: 2025-01-12

## 2024-11-20 ENCOUNTER — OFFICE VISIT (OUTPATIENT)
Dept: URBAN - METROPOLITAN AREA CLINIC 107 | Facility: CLINIC | Age: 44
End: 2024-11-20
Payer: COMMERCIAL

## 2024-11-20 VITALS
DIASTOLIC BLOOD PRESSURE: 107 MMHG | HEIGHT: 74 IN | TEMPERATURE: 97.3 F | SYSTOLIC BLOOD PRESSURE: 163 MMHG | HEART RATE: 84 BPM | BODY MASS INDEX: 40.43 KG/M2 | WEIGHT: 315 LBS

## 2024-11-20 DIAGNOSIS — R19.7 DIARRHEA, UNSPECIFIED TYPE: ICD-10-CM

## 2024-11-20 DIAGNOSIS — K64.2 GRADE III INTERNAL HEMORRHOIDS: ICD-10-CM

## 2024-11-20 PROCEDURE — 46221 LIGATION OF HEMORRHOID(S): CPT | Performed by: NURSE PRACTITIONER

## 2024-11-20 RX ORDER — DIPHENOXYLATE HYDROCHLORIDE AND ATROPINE SULFATE 2.5; .025 MG/1; MG/1
1 TABLET AS NEEDED TABLET ORAL
Qty: 120 TABLET | Refills: 1
Start: 2024-10-03 | End: 2025-01-19

## 2024-11-20 RX ORDER — METHYLPHENIDATE HYDROCHLORIDE 36 MG/1
1 TABLET IN THE MORNING TABLET, EXTENDED RELEASE ORAL ONCE A DAY
Status: ON HOLD | COMMUNITY

## 2024-11-20 RX ORDER — LISINOPRIL 20 MG/1
1 TABLET TABLET ORAL ONCE A DAY
Status: ACTIVE | COMMUNITY

## 2024-11-20 RX ORDER — PANTOPRAZOLE 40 MG/1
TAKE 1 TABLET BY MOUTH ONCE DAILY TABLET, DELAYED RELEASE ORAL
Qty: 30 TABLET | Refills: 11 | Status: ACTIVE | COMMUNITY

## 2024-11-20 RX ORDER — ATENOLOL 25 MG/1
1 TABLET TABLET ORAL ONCE A DAY
Status: ACTIVE | COMMUNITY

## 2024-11-20 RX ORDER — COLESEVELAM HYDROCHLORIDE 625 MG/1
3 TABLETS WITH MEALS TABLET, FILM COATED ORAL TWICE A DAY
Qty: 180 TABLET | Refills: 11 | Status: ACTIVE | COMMUNITY

## 2024-11-20 RX ORDER — DIPHENOXYLATE HYDROCHLORIDE AND ATROPINE SULFATE 2.5; .025 MG/1; MG/1
1 TABLET AS NEEDED TABLET ORAL
Qty: 120 TABLET | Refills: 1 | Status: ACTIVE | COMMUNITY
Start: 2024-10-03 | End: 2025-01-12

## 2024-11-20 RX ORDER — DICYCLOMINE HYDROCHLORIDE 10 MG/1
1 TABLET CAPSULE ORAL
Qty: 90 | Refills: 3 | Status: ACTIVE | COMMUNITY

## 2024-12-02 ENCOUNTER — ERX REFILL RESPONSE (OUTPATIENT)
Dept: URBAN - METROPOLITAN AREA CLINIC 72 | Facility: CLINIC | Age: 44
End: 2024-12-02

## 2024-12-02 RX ORDER — PANTOPRAZOLE 40 MG/1
TAKE 1 TABLET BY MOUTH ONCE DAILY TABLET, DELAYED RELEASE ORAL
Qty: 30 TABLET | Refills: 11 | OUTPATIENT

## 2025-01-06 ENCOUNTER — OFFICE VISIT (OUTPATIENT)
Dept: URBAN - METROPOLITAN AREA CLINIC 107 | Facility: CLINIC | Age: 45
End: 2025-01-06

## 2025-07-07 ENCOUNTER — ERX REFILL RESPONSE (OUTPATIENT)
Dept: URBAN - METROPOLITAN AREA CLINIC 72 | Facility: CLINIC | Age: 45
End: 2025-07-07

## 2025-07-07 RX ORDER — COLESEVELAM HYDROCHLORIDE 625 MG/1
3 TABLETS WITH MEALS TABLET, FILM COATED ORAL TWICE A DAY
Qty: 180 TABLET | Refills: 11